# Patient Record
Sex: MALE | URBAN - METROPOLITAN AREA
[De-identification: names, ages, dates, MRNs, and addresses within clinical notes are randomized per-mention and may not be internally consistent; named-entity substitution may affect disease eponyms.]

---

## 2022-09-13 ENCOUNTER — TELEPHONE (OUTPATIENT)
Dept: PSYCHIATRY | Facility: CLINIC | Age: 32
End: 2022-09-13

## 2022-09-14 ENCOUNTER — TELEPHONE (OUTPATIENT)
Dept: PSYCHIATRY | Facility: CLINIC | Age: 32
End: 2022-09-14

## 2022-09-14 NOTE — TELEPHONE ENCOUNTER
Vyvanse 40mg [place] approval received for 12 months  Will send to be scanned into chart  Will make pharmacy aware

## 2022-09-29 ENCOUNTER — DOCUMENTATION (OUTPATIENT)
Dept: PSYCHIATRY | Facility: CLINIC | Age: 32
End: 2022-09-29

## 2022-11-04 DIAGNOSIS — F39 MOOD DISORDER (HCC): Primary | ICD-10-CM

## 2022-11-04 NOTE — TELEPHONE ENCOUNTER
Medication Refill Request     Name of Medication olanzapine  Dose/Frequency 5mg  Quantity 12  Verified pharmacy   [x]  Verified ordering Provider   [x]  Does patient have enough for the next 3 days? Yes [] No [x]  Does patient have a follow-up appointment scheduled?  Yes [x] No []   If so when is appointment: 11/9/2022

## 2022-11-07 RX ORDER — OLANZAPINE 5 MG/1
5 TABLET ORAL 2 TIMES DAILY
Qty: 12 TABLET | Refills: 0 | Status: SHIPPED | OUTPATIENT
Start: 2022-11-07 | End: 2022-11-09 | Stop reason: SDUPTHER

## 2022-11-09 ENCOUNTER — TELEMEDICINE (OUTPATIENT)
Dept: PSYCHIATRY | Facility: CLINIC | Age: 32
End: 2022-11-09

## 2022-11-09 DIAGNOSIS — F25.0 SCHIZOAFFECTIVE DISORDER, BIPOLAR TYPE (HCC): Primary | ICD-10-CM

## 2022-11-09 DIAGNOSIS — F39 MOOD DISORDER (HCC): ICD-10-CM

## 2022-11-09 DIAGNOSIS — F41.1 GENERALIZED ANXIETY DISORDER: ICD-10-CM

## 2022-11-09 DIAGNOSIS — F90.0 ATTENTION DEFICIT HYPERACTIVITY DISORDER, INATTENTIVE TYPE: ICD-10-CM

## 2022-11-09 PROBLEM — L23.7 POISON IVY: Status: ACTIVE | Noted: 2022-11-09

## 2022-11-09 RX ORDER — OLANZAPINE 5 MG/1
5 TABLET ORAL 2 TIMES DAILY
Qty: 60 TABLET | Refills: 1 | Status: SHIPPED | OUTPATIENT
Start: 2022-11-09

## 2022-11-09 RX ORDER — CLONAZEPAM 0.5 MG/1
TABLET ORAL
COMMUNITY
End: 2022-11-09

## 2022-11-09 RX ORDER — LAMOTRIGINE 200 MG/1
200 TABLET ORAL 2 TIMES DAILY
COMMUNITY
Start: 2022-10-11 | End: 2022-11-09 | Stop reason: SDUPTHER

## 2022-11-09 RX ORDER — GABAPENTIN 100 MG/1
CAPSULE ORAL
COMMUNITY
Start: 2022-10-19 | End: 2022-11-09

## 2022-11-09 RX ORDER — LISDEXAMFETAMINE DIMESYLATE 40 MG/1
40 CAPSULE ORAL EVERY MORNING
COMMUNITY
Start: 2022-10-19 | End: 2022-11-09 | Stop reason: SDUPTHER

## 2022-11-09 RX ORDER — GABAPENTIN 600 MG/1
600 TABLET ORAL 3 TIMES DAILY
Qty: 90 TABLET | Refills: 1 | Status: SHIPPED | OUTPATIENT
Start: 2022-11-09

## 2022-11-09 RX ORDER — GABAPENTIN 400 MG/1
CAPSULE ORAL
COMMUNITY
Start: 2022-10-20 | End: 2022-11-09

## 2022-11-09 RX ORDER — LISDEXAMFETAMINE DIMESYLATE 40 MG/1
40 CAPSULE ORAL EVERY MORNING
Qty: 30 CAPSULE | Refills: 0 | Status: SHIPPED | OUTPATIENT
Start: 2022-11-09

## 2022-11-09 RX ORDER — RISPERIDONE 1 MG/1
TABLET, FILM COATED ORAL EVERY 12 HOURS
COMMUNITY
End: 2022-11-09

## 2022-11-09 RX ORDER — LAMOTRIGINE 200 MG/1
200 TABLET ORAL 2 TIMES DAILY
Qty: 60 TABLET | Refills: 2 | Status: SHIPPED | OUTPATIENT
Start: 2022-11-09

## 2022-11-09 NOTE — PSYCH
Virtual Regular Visit    Verification of patient location:    Patient is located in the following state in which I hold an active license [place]      Assessment/Plan:  Increase neurontin 600 mg tid  Continue zyprexa 5 mg bid, lamictal 200 mg bid, vyvanse 40 mg     Problem List Items Addressed This Visit    None     Visit Diagnoses     Schizoaffective disorder, bipolar type ([facility] [address] )    -  Primary    Relevant Medications    Vyvanse 40 MG capsule    lisdexamfetamine (Vyvanse) 40 MG capsule    OLANZapine (ZyPREXA) 5 mg tablet    lamoTRIgine (LaMICtal) 200 MG tablet    Attention deficit hyperactivity disorder, inattentive type        Relevant Medications    Vyvanse 40 MG capsule    lisdexamfetamine (Vyvanse) 40 MG capsule    OLANZapine (ZyPREXA) 5 mg tablet    Generalized anxiety disorder        Relevant Medications    Vyvanse 40 MG capsule    lisdexamfetamine (Vyvanse) 40 MG capsule    OLANZapine (ZyPREXA) 5 mg tablet    gabapentin (Neurontin) 600 MG tablet    Mood disorder (HCC)        Relevant Medications    Vyvanse 40 MG capsule    lisdexamfetamine (Vyvanse) 40 MG capsule    OLANZapine (ZyPREXA) 5 mg tablet          Goals addressed in session: Goal 1          Reason for visit is   Chief Complaint   Patient presents with   • Medication Management        Encounter provider Bebe Stevens MD    Provider located [place] [address] [place] [address]  [phone]      Recent Visits  No visits were found meeting these conditions  Showing recent visits within past 7 days and meeting all other requirements  Today's Visits  Date Type Provider Dept   11/09/22 Telemedicine Bebe Stevens, [address] [place] [address] today's visits and meeting all other requirements  Future Appointments  No visits were found meeting these conditions    Showing future appointments within next 150 days and meeting all other requirements       The patient was identified by name and date of birth  Fern Xiao was informed that this is a telemedicine visit and that the [facility]  He agrees to proceed     My office door was closed  No one else was in the room  He acknowledged consent and understanding of privacy and security of the video platform  The patient has agreed to participate and understands they can discontinue the visit at any time  Patient is aware this is a billable service  Subjective  Fern Xiao is a 28 y o  male with SAD and ADHD presents for regular f/u   Compliant with meds, denies SE  Pt c/o feeling stressed, overwhelmed, anxious sometimes  Denies medical problems  Lives with mother, works at [employer] 1/2 P[employer]; occassionally beer; denies weed use      HPI   Mood - continues to have mood swings - 1-2 x week for 1/2 day - increased energy and racing thoughtsl stays up late and 3-4 x week episodes of low energy, " apathy", tired and depressed  Anxiety - increased racing thoughts, restless, overwhelmed  ADHD - baseline, good concentration most of the day  Psychosis - denies delusions or AH    History reviewed  No pertinent past medical history  History reviewed  No pertinent surgical history  Current Outpatient Medications   Medication Sig Dispense Refill   • gabapentin (Neurontin) 600 MG tablet Take 1 tablet (600 mg total) by mouth 3 (three) times a day 90 tablet 1   • lamoTRIgine (LaMICtal) 200 MG tablet Take 1 tablet (200 mg total) by mouth 2 (two) times a day 60 tablet 2   • lisdexamfetamine (Vyvanse) 40 MG capsule Take 1 capsule (40 mg total) by mouth every morning Max Daily Amount: 40 mg 30 capsule 0   • OLANZapine (ZyPREXA) 5 mg tablet Take 1 tablet (5 mg total) by mouth 2 (two) times a day 60 tablet 1   • Vyvanse 40 MG capsule Take 1 capsule (40 mg total) by mouth every morning Max Daily Amount: 40 mg 30 capsule 0     No current facility-administered medications for this visit  No Known Allergies    Review of Systems   Constitutional: Negative for activity change and appetite change  Psychiatric/Behavioral: Positive for sleep disturbance (poor sleep sometimes)  Negative for suicidal ideas  The patient is nervous/anxious  Video Exam    There were no vitals filed for this visit  Physical Exam  Constitutional:       Appearance: Normal appearance  He is normal weight  Neurological:      Mental Status: He is alert  Psychiatric:         Attention and Perception: Attention and perception normal          Mood and Affect: Mood is anxious  Affect is blunt  Speech: Speech is delayed  Behavior: Behavior normal  Behavior is cooperative  Thought Content:  Thought content normal           Visit Time    Visit Start Time: 10 57 am   Visit Stop Time:11 08 am   Total Visit Duration: 20

## 2023-01-24 ENCOUNTER — TELEMEDICINE (OUTPATIENT)
Dept: PSYCHIATRY | Facility: CLINIC | Age: 33
End: 2023-01-24

## 2023-01-24 DIAGNOSIS — F25.0 SCHIZOAFFECTIVE DISORDER, BIPOLAR TYPE (HCC): Primary | ICD-10-CM

## 2023-01-24 DIAGNOSIS — F90.0 ATTENTION DEFICIT HYPERACTIVITY DISORDER, INATTENTIVE TYPE: ICD-10-CM

## 2023-01-24 DIAGNOSIS — F39 MOOD DISORDER (HCC): ICD-10-CM

## 2023-01-24 DIAGNOSIS — F41.1 GENERALIZED ANXIETY DISORDER: ICD-10-CM

## 2023-01-24 RX ORDER — OLANZAPINE 5 MG/1
5 TABLET ORAL 2 TIMES DAILY
Qty: 60 TABLET | Refills: 2 | Status: SHIPPED | OUTPATIENT
Start: 2023-01-24

## 2023-01-24 RX ORDER — LISDEXAMFETAMINE DIMESYLATE 40 MG/1
40 CAPSULE ORAL EVERY MORNING
Qty: 30 CAPSULE | Refills: 0 | Status: SHIPPED | OUTPATIENT
Start: 2023-01-24

## 2023-01-24 RX ORDER — GABAPENTIN 600 MG/1
600 TABLET ORAL 3 TIMES DAILY
Qty: 90 TABLET | Refills: 2 | Status: SHIPPED | OUTPATIENT
Start: 2023-01-24

## 2023-01-24 RX ORDER — LAMOTRIGINE 200 MG/1
200 TABLET ORAL 2 TIMES DAILY
Qty: 60 TABLET | Refills: 2 | Status: SHIPPED | OUTPATIENT
Start: 2023-01-24

## 2023-01-24 NOTE — PSYCH
Virtual Regular Visit    Verification of patient location:    Patient is located in the following state in which [place]      Assessment/Plan:    Problem List Items Addressed This Visit    None  Visit Diagnoses     Schizoaffective disorder, bipolar type ([facility] [address] )    -  Primary    Relevant Medications    Vyvanse 40 MG capsule    OLANZapine (ZyPREXA) 5 mg tablet    lisdexamfetamine (Vyvanse) 40 MG capsule    lamoTRIgine (LaMICtal) 200 MG tablet    lisdexamfetamine (Vyvanse) 40 MG capsule    Attention deficit hyperactivity disorder, inattentive type        Relevant Medications    Vyvanse 40 MG capsule    OLANZapine (ZyPREXA) 5 mg tablet    lisdexamfetamine (Vyvanse) 40 MG capsule    lisdexamfetamine (Vyvanse) 40 MG capsule    Generalized anxiety disorder        Relevant Medications    Vyvanse 40 MG capsule    OLANZapine (ZyPREXA) 5 mg tablet    lisdexamfetamine (Vyvanse) 40 MG capsule    gabapentin (Neurontin) 600 MG tablet    lisdexamfetamine (Vyvanse) 40 MG capsule    Mood disorder (HCC)        Relevant Medications    Vyvanse 40 MG capsule    OLANZapine (ZyPREXA) 5 mg tablet    lisdexamfetamine (Vyvanse) 40 MG capsule    lisdexamfetamine (Vyvanse) 40 MG capsule          Goals addressed in session: Goal 1          Reason for visit is   Chief Complaint   Patient presents with   • Medication Management        Encounter provider Allyson Portillo MD    Provider located at [address] [place] [address]  [phone]      Recent Visits  No visits were found meeting these conditions  Showing recent visits within past 7 days and meeting all other requirements  Today's Visits  Date Type Provider Dept   01/24/23 Telemedicine Allyson Portillo, [address] [place] [address] today's visits and meeting all other requirements  Future Appointments  No visits were found meeting these conditions    Showing future appointments within next 150 days and meeting all other requirements       The patient was identified by name and date of birth  Carlos Sykes was informed that this is a telemedicine visit and that the [facility]  He agrees to proceed     My office door was closed  No one else was in the room  He acknowledged consent and understanding of privacy and security of the video platform  The patient has agreed to participate and understands they can discontinue the visit at any time  Patient is aware this is a billable service  Subjective  Carlos Sykes is a 28 y o  male with ADHD, mood, psychosis presents for regular f/u    Pt ran out of meds and was off meds for a week  - got supply  Recovering from URI  Works at [employer] with mother - some arguments  Grandfather in [place] - supportive - didn't`t come for [place]  Smokes 3/4 PPD   Denies weed or alcohol use      HPI   Mood - mild mood swings, but mostly stable; functions at baseline  Less depressed  Anxiety - improved on higher neurontin dose - less worries and racing thoughts; calmer and more relaxed  Psychosis - denies AH or delusions  ADHD - stable; baseline    History reviewed  No pertinent past medical history  History reviewed  No pertinent surgical history      Current Outpatient Medications   Medication Sig Dispense Refill   • gabapentin (Neurontin) 600 MG tablet Take 1 tablet (600 mg total) by mouth 3 (three) times a day 90 tablet 2   • lamoTRIgine (LaMICtal) 200 MG tablet Take 1 tablet (200 mg total) by mouth 2 (two) times a day 60 tablet 2   • lisdexamfetamine (Vyvanse) 40 MG capsule Take 1 capsule (40 mg total) by mouth every morning Max Daily Amount: 40 mg 30 capsule 0   • lisdexamfetamine (Vyvanse) 40 MG capsule Take 1 capsule (40 mg total) by mouth every morning Max Daily Amount: 40 mg 30 capsule 0   • OLANZapine (ZyPREXA) 5 mg tablet Take 1 tablet (5 mg total) by mouth 2 (two) times a day 60 tablet 2   • Vyvanse 40 MG capsule Take 1 capsule (40 mg total) by mouth every morning Max Daily Amount: 40 mg 30 capsule 0     No current facility-administered medications for this visit  No Known Allergies    Review of Systems   Constitutional: Negative for activity change and appetite change  Psychiatric/Behavioral: Negative for decreased concentration, dysphoric mood, sleep disturbance and suicidal ideas  The patient is not nervous/anxious  Video Exam    There were no vitals filed for this visit  Physical Exam  Constitutional:       Appearance: Normal appearance  He is normal weight  Neurological:      Mental Status: He is alert  Psychiatric:         Attention and Perception: Attention and perception normal          Mood and Affect: Mood normal  Affect is blunt  Behavior: Behavior normal  Behavior is cooperative  Thought Content:  Thought content normal          Judgment: Judgment normal       Comments: Monotone speech at baseline          Visit Time    Visit Start Time: 10 19 am   Visit Stop Time: 10 25 am   Total Visit Duration: 16 minutes

## 2023-04-25 ENCOUNTER — TELEMEDICINE (OUTPATIENT)
Dept: PSYCHIATRY | Facility: CLINIC | Age: 33
End: 2023-04-25

## 2023-04-25 DIAGNOSIS — F90.0 ATTENTION DEFICIT HYPERACTIVITY DISORDER, INATTENTIVE TYPE: ICD-10-CM

## 2023-04-25 DIAGNOSIS — F39 MOOD DISORDER (HCC): ICD-10-CM

## 2023-04-25 DIAGNOSIS — F25.0 SCHIZOAFFECTIVE DISORDER, BIPOLAR TYPE (HCC): Primary | ICD-10-CM

## 2023-04-25 DIAGNOSIS — F41.1 GENERALIZED ANXIETY DISORDER: ICD-10-CM

## 2023-04-25 RX ORDER — LAMOTRIGINE 200 MG/1
200 TABLET ORAL 2 TIMES DAILY
Qty: 60 TABLET | Refills: 2 | Status: SHIPPED | OUTPATIENT
Start: 2023-04-25

## 2023-04-25 RX ORDER — OLANZAPINE 5 MG/1
5 TABLET ORAL 2 TIMES DAILY
Qty: 60 TABLET | Refills: 2 | Status: SHIPPED | OUTPATIENT
Start: 2023-04-25

## 2023-04-25 RX ORDER — LISDEXAMFETAMINE DIMESYLATE 40 MG/1
40 CAPSULE ORAL EVERY MORNING
Qty: 30 CAPSULE | Refills: 0 | Status: SHIPPED | OUTPATIENT
Start: 2023-04-25

## 2023-04-25 RX ORDER — GABAPENTIN 600 MG/1
600 TABLET ORAL 3 TIMES DAILY
Qty: 90 TABLET | Refills: 2 | Status: SHIPPED | OUTPATIENT
Start: 2023-04-25

## 2023-04-25 NOTE — PSYCH
"  Virtual Regular Visit    Verification of patient location:    Patient is located at Home in the following state in which [place]      Assessment/Plan:    Problem List Items Addressed This Visit    None      Goals addressed in session: Goal 1          Reason for visit is   Chief Complaint   Patient presents with   • Medication Management        Encounter provider Ibeth Long MD    Provider located at [address] [place] [facility]  [address]  [phone]      Recent Visits  No visits were found meeting these conditions  Showing recent visits within past 7 days and meeting all other requirements  Today's Visits  Date Type Provider Dept   04/25/23 Telemedicine Ibeth Long, [address] [address] today's visits and meeting all other requirements  Future Appointments  No visits were found meeting these conditions  Showing future appointments within next 150 days and meeting all other requirements       The patient was identified by name and date of birth  Antonio Staff was informed that this is a telemedicine visit and that the visit [facility]  He agrees to proceed     My office door was closed  No one else was in the room  He acknowledged consent and understanding of privacy and security of the video platform  The patient has agreed to participate and understands they can discontinue the visit at any time  Patient is aware this is a billable service  Subjective  Antonio Staff is a 28 y o  male with SAD, ADHD presents for regular f/u     compliant with meds, denies SE  Today c/o \" not feeling well\" - diarrhea, fever, stomach pain   - stomach virus?   Increased work hrs, only 1 day off      HPI   Mood - pt feels stressed and overwhelmed; gets tired and depressed; less ADLs  Anxiety - increased intrusive negative racing thoughts  Psychosis - denies delusions or "   ADHD  - stable ; functions at baseline    History reviewed  No pertinent past medical history  History reviewed  No pertinent surgical history  Current Outpatient Medications   Medication Sig Dispense Refill   • gabapentin (Neurontin) 600 MG tablet Take 1 tablet (600 mg total) by mouth 3 (three) times a day 90 tablet 2   • lamoTRIgine (LaMICtal) 200 MG tablet Take 1 tablet (200 mg total) by mouth 2 (two) times a day 60 tablet 2   • lisdexamfetamine (Vyvanse) 40 MG capsule Take 1 capsule (40 mg total) by mouth every morning Max Daily Amount: 40 mg 30 capsule 0   • OLANZapine (ZyPREXA) 5 mg tablet Take 1 tablet (5 mg total) by mouth 2 (two) times a day 60 tablet 2   • lisdexamfetamine (Vyvanse) 40 MG capsule Take 1 capsule (40 mg total) by mouth every morning Max Daily Amount: 40 mg 30 capsule 0   • Vyvanse 40 MG capsule Take 1 capsule (40 mg total) by mouth every morning Max Daily Amount: 40 mg 30 capsule 0     No current facility-administered medications for this visit  No Known Allergies    Review of Systems   Constitutional: Negative for activity change and appetite change  Gastrointestinal: Positive for diarrhea  Psychiatric/Behavioral: Positive for dysphoric mood and sleep disturbance (poor sleep sometimes)  The patient is nervous/anxious  Video Exam    There were no vitals filed for this visit  Physical Exam  Constitutional:       Appearance: Normal appearance  He is normal weight  Neurological:      Mental Status: He is alert  Psychiatric:         Attention and Perception: Attention and perception normal          Mood and Affect: Mood is anxious and depressed  Affect is blunt  Speech: Speech normal          Behavior: Behavior normal  Behavior is cooperative  Thought Content:  Thought content normal          Judgment: Judgment normal           Visit Time    Visit Start Time: 10 18 am   Visit Stop Time: 10  26 am   Total Visit Duration: 17 minutes

## 2023-06-20 ENCOUNTER — TELEPHONE (OUTPATIENT)
Dept: PSYCHIATRY | Facility: CLINIC | Age: 33
End: 2023-06-20

## 2023-06-20 NOTE — TELEPHONE ENCOUNTER
Called and left message for client to reschedule appt  with Dr Page   Appt  due in July (prior to 7/25)

## 2023-07-29 DIAGNOSIS — F41.1 GENERALIZED ANXIETY DISORDER: ICD-10-CM

## 2023-07-31 RX ORDER — GABAPENTIN 600 MG/1
600 TABLET ORAL 3 TIMES DAILY
Qty: 90 TABLET | Refills: 2 | OUTPATIENT
Start: 2023-07-31

## 2023-08-15 ENCOUNTER — TELEPHONE (OUTPATIENT)
Dept: PSYCHIATRY | Facility: CLINIC | Age: 33
End: 2023-08-15

## 2023-08-17 NOTE — TELEPHONE ENCOUNTER
Another outreach call place. Informed client of the documents and payments that will need to be done to keep the appointment.   Requested call back to [phone]

## 2023-08-22 ENCOUNTER — TELEMEDICINE (OUTPATIENT)
Dept: PSYCHIATRY | Facility: CLINIC | Age: 33
End: 2023-08-22

## 2023-08-22 DIAGNOSIS — F25.0 SCHIZOAFFECTIVE DISORDER, BIPOLAR TYPE (HCC): Primary | ICD-10-CM

## 2023-08-22 DIAGNOSIS — F41.1 GENERALIZED ANXIETY DISORDER: ICD-10-CM

## 2023-08-22 DIAGNOSIS — F90.0 ATTENTION DEFICIT HYPERACTIVITY DISORDER, INATTENTIVE TYPE: ICD-10-CM

## 2023-08-22 DIAGNOSIS — F39 MOOD DISORDER (HCC): ICD-10-CM

## 2023-08-22 PROCEDURE — 99214 OFFICE O/P EST MOD 30 MIN: CPT | Performed by: PSYCHIATRY & NEUROLOGY

## 2023-08-22 RX ORDER — OLANZAPINE 5 MG/1
5 TABLET ORAL 2 TIMES DAILY
Qty: 60 TABLET | Refills: 1 | Status: SHIPPED | OUTPATIENT
Start: 2023-08-22

## 2023-08-22 RX ORDER — GABAPENTIN 600 MG/1
600 TABLET ORAL 3 TIMES DAILY
Qty: 90 TABLET | Refills: 1 | Status: SHIPPED | OUTPATIENT
Start: 2023-08-22

## 2023-08-22 RX ORDER — LISDEXAMFETAMINE DIMESYLATE 40 MG/1
40 CAPSULE ORAL EVERY MORNING
Qty: 30 CAPSULE | Refills: 0 | Status: SHIPPED | OUTPATIENT
Start: 2023-08-22

## 2023-08-22 RX ORDER — LAMOTRIGINE 200 MG/1
200 TABLET ORAL 2 TIMES DAILY
Qty: 60 TABLET | Refills: 1 | Status: SHIPPED | OUTPATIENT
Start: 2023-08-22

## 2023-08-22 NOTE — PSYCH
Virtual Regular Visit    Verification of patient location:    Patient is located at Home in the following state in which I [place] an [place]      Assessment/Plan:    Problem List Items Addressed This Visit    None      Goals addressed in session: Goal 1          Reason for visit is   Chief Complaint   Patient presents with   • Medication Management        Encounter provider Deysi العلي MD    Provider located at [address] [place] [address],[address]  [facility]  13[address] [facility] [address] [place] [address]  [phone]      Recent Visits  Date Type Provider Dept   08/15/23 Telephone 2304 [facility] 121 recent visits within past 7 days and meeting all other requirements  Today's Visits  Date Type Provider Dept   08/22/23 Telemedicine Deysi العلي, [address] today's visits and meeting all other requirements  Future Appointments  No visits were found meeting these conditions. Showing future appointments within next 150 days and meeting all other requirements       The patient was identified by name and date of birth. Mark Farfan was informed that this is a telemedicine visit and [facility]. He agrees to proceed. .  My office door was closed. No one else was in the room. He acknowledged consent and understanding of privacy and security of the video platform. The patient has agreed to participate and understands they can discontinue the visit at any time. Patient is aware this is a billable service. Subjective  Mark Farfan is a 35 y.o. male with SAD presents for regular f/u   He lost insurance; ran out of meds 2 weeks ago ( except lamictal)  Better work schedule; has 2 days x week off  Recovered from URI   Grandfather is visiting from [place] .       HPI   Mood - feeling stressed, sometimes emotionally unstable, but does ADLs  ADHD - gets overwhelmed, poor concentration, less productive; " struggles" at work  Psychosis - denies AH or delusions    History reviewed. No pertinent past medical history. History reviewed. No pertinent surgical history. Current Outpatient Medications   Medication Sig Dispense Refill   • gabapentin (Neurontin) 600 MG tablet Take 1 tablet (600 mg total) by mouth 3 (three) times a day 90 tablet 2   • lamoTRIgine (LaMICtal) 200 MG tablet Take 1 tablet (200 mg total) by mouth 2 (two) times a day 60 tablet 2   • lisdexamfetamine (Vyvanse) 40 MG capsule Take 1 capsule (40 mg total) by mouth every morning Max Daily Amount: 40 mg 30 capsule 0   • lisdexamfetamine (Vyvanse) 40 MG capsule Take 1 capsule (40 mg total) by mouth every morning Max Daily Amount: 40 mg 30 capsule 0   • OLANZapine (ZyPREXA) 5 mg tablet Take 1 tablet (5 mg total) by mouth 2 (two) times a day 60 tablet 2   • Vyvanse 40 MG capsule Take 1 capsule (40 mg total) by mouth every morning Max Daily Amount: 40 mg 30 capsule 0     No current facility-administered medications for this visit. No Known Allergies    Review of Systems   Constitutional: Negative for activity change and appetite change. Psychiatric/Behavioral: Positive for decreased concentration and sleep disturbance. Negative for dysphoric mood and suicidal ideas. The patient is not nervous/anxious. Video Exam    There were no vitals filed for this visit. Physical Exam  Constitutional:       Appearance: Normal appearance. He is normal weight. Comments: Unkempt hair   Neurological:      Mental Status: He is alert. Psychiatric:         Attention and Perception: Perception normal. He is inattentive. Mood and Affect: Mood normal. Affect is blunt. Speech: Speech normal.         Behavior: Behavior normal. Behavior is cooperative. Thought Content:  Thought content normal.         Judgment: Judgment normal.          Visit Time    Visit Start Time: 10.39 am    Visit Stop Time: 10.47 am    Total Visit Duration: 20 minutes

## 2023-10-10 ENCOUNTER — TELEPHONE (OUTPATIENT)
Dept: PSYCHIATRY | Facility: CLINIC | Age: 33
End: 2023-10-10

## 2023-10-16 ENCOUNTER — TELEMEDICINE (OUTPATIENT)
Dept: PSYCHIATRY | Facility: CLINIC | Age: 33
End: 2023-10-16
Payer: COMMERCIAL

## 2023-10-16 DIAGNOSIS — F41.1 GENERALIZED ANXIETY DISORDER: ICD-10-CM

## 2023-10-16 DIAGNOSIS — F39 MOOD DISORDER (HCC): ICD-10-CM

## 2023-10-16 DIAGNOSIS — F25.0 SCHIZOAFFECTIVE DISORDER, BIPOLAR TYPE (HCC): ICD-10-CM

## 2023-10-16 DIAGNOSIS — F90.0 ATTENTION DEFICIT HYPERACTIVITY DISORDER, INATTENTIVE TYPE: ICD-10-CM

## 2023-10-16 PROCEDURE — 99214 OFFICE O/P EST MOD 30 MIN: CPT | Performed by: PSYCHIATRY & NEUROLOGY

## 2023-10-16 RX ORDER — OLANZAPINE 5 MG/1
5 TABLET ORAL 2 TIMES DAILY
Qty: 60 TABLET | Refills: 2 | Status: SHIPPED | OUTPATIENT
Start: 2023-10-16

## 2023-10-16 RX ORDER — LISDEXAMFETAMINE DIMESYLATE CAPSULES 40 MG/1
40 CAPSULE ORAL EVERY MORNING
Qty: 30 CAPSULE | Refills: 0 | Status: SHIPPED | OUTPATIENT
Start: 2023-10-16

## 2023-10-16 RX ORDER — LAMOTRIGINE 200 MG/1
200 TABLET ORAL 2 TIMES DAILY
Qty: 60 TABLET | Refills: 2 | Status: SHIPPED | OUTPATIENT
Start: 2023-10-16

## 2023-10-16 RX ORDER — GABAPENTIN 600 MG/1
600 TABLET ORAL 3 TIMES DAILY
Qty: 90 TABLET | Refills: 2 | Status: SHIPPED | OUTPATIENT
Start: 2023-10-16

## 2023-10-16 RX ORDER — LISDEXAMFETAMINE DIMESYLATE 40 MG/1
40 CAPSULE ORAL EVERY MORNING
Qty: 30 CAPSULE | Refills: 0 | Status: SHIPPED | OUTPATIENT
Start: 2023-10-16

## 2023-10-16 NOTE — PSYCH
Virtual Regular Visit    Verification of patient location:    Patient is located at Home in the following state in which I [place] an [place]      Assessment/Plan:    Problem List Items Addressed This Visit    None      Goals addressed in session: Goal 1          Reason for visit is   Chief Complaint   Patient presents with    Medication Management        Encounter provider Ken Madrigal MD    Provider located at [address] [place] [address],[address]  [facility]  13[address] [facility] [address] [place] [address]  [phone]      Recent Visits  Date Type Provider Dept   10/10/23 Telephone 2304 [facility] 121 recent visits within past 7 days and meeting all other requirements  Today's Visits  Date Type Provider Dept   10/16/23 Telemedicine Ken Madrigal, [address] today's visits and meeting all other requirements  Future Appointments  No visits were found meeting these conditions. Showing future appointments within next 150 days and meeting all other requirements       The patient was identified by name and date of birth. Salvatore Reynolds was informed that this is a telemedicine visit and that [facility]. He agrees to proceed. .  My office door was closed. No one else was in the room. He acknowledged consent and understanding of privacy and security of the video platform. The patient has agreed to participate and understands they can discontinue the visit at any time. Patient is aware this is a billable service. Subjective  Salvatore Reynolds is a 35 y.o. male with SAD and ADHD presents for regular f/u  .   Off vyvanse; continued other meds  Still no insurance  Works  Lives with mother  Denies acute medical problems      HPI   Mood - improving on meds; but gets mild depression when anxious  Anxiety - racing thoughts, increased worries sometimes  ADHD - can function in his routine, but gets overwhelmed and stressed; racing thoughts at night, less productive, less focus    History reviewed. No pertinent past medical history. History reviewed. No pertinent surgical history. Current Outpatient Medications   Medication Sig Dispense Refill    gabapentin (Neurontin) 600 MG tablet Take 1 tablet (600 mg total) by mouth 3 (three) times a day 90 tablet 1    lamoTRIgine (LaMICtal) 200 MG tablet Take 1 tablet (200 mg total) by mouth 2 (two) times a day 60 tablet 1    lisdexamfetamine (Vyvanse) 40 MG capsule Take 1 capsule (40 mg total) by mouth every morning Max Daily Amount: 40 mg 30 capsule 0    lisdexamfetamine (Vyvanse) 40 MG capsule Take 1 capsule (40 mg total) by mouth every morning Max Daily Amount: 40 mg 30 capsule 0    OLANZapine (ZyPREXA) 5 mg tablet Take 1 tablet (5 mg total) by mouth 2 (two) times a day 60 tablet 1    Vyvanse 40 MG capsule Take 1 capsule (40 mg total) by mouth every morning Max Daily Amount: 40 mg 30 capsule 0     No current facility-administered medications for this visit. No Known Allergies    Review of Systems   Constitutional:  Negative for activity change and appetite change. Psychiatric/Behavioral:  Positive for decreased concentration and sleep disturbance (difficult to fall asleep). Negative for dysphoric mood and suicidal ideas. The patient is nervous/anxious. Video Exam    There were no vitals filed for this visit. Physical Exam  Constitutional:       Appearance: Normal appearance. He is normal weight. Neurological:      Mental Status: He is alert. Psychiatric:         Attention and Perception: Perception normal. He is inattentive. Mood and Affect: Mood normal. Affect is blunt. Speech: Speech normal.         Behavior: Behavior normal. Behavior is cooperative. Thought Content:  Thought content normal.         Judgment: Judgment normal.          Visit Time    Visit Start Time: 9.39 am   Visit Stop Time: 9.46 am   Total Visit Duration:  16 minutes    TREATMENT PLAN (Medication Management Only)        [facility]    Name and Date of Birth:  Yaquelin Lanier 35 y.o. 1990  Date of Treatment Plan: October 16, 2023  Diagnosis/Diagnoses:    1. Attention deficit hyperactivity disorder, inattentive type    2. Mood disorder (HCC)    3. Schizoaffective disorder, bipolar type ([address])    4. Generalized anxiety disorder      Strengths/Personal Resources for Self-Care: taking medications as prescribed, motivation for treatment. Area/Areas of need (in own words): anxiety, mood instability, ADHD symptoms  1. Long Term Goal: continue improvement in psychotic symptoms. Target Date:6 months - 4/16/2024  Person/Persons responsible for completion of goal: Marie Estrada  2. Short Term Objective (s) - How will we reach this goal?:   A. Provider new recommended medication/dosage changes and/or continue medication(s): continue current medications as prescribed Lamictal, Zyprexa, Vyvanse. B. N/A.  C. N/A. Target Date:6 months - 4/16/2024  Person/Persons Responsible for Completion of Goal: Marie Estrada  Progress Towards Goals: continuing treatment  Treatment Modality: medication management every 3 months  Review due 180 days from date of this plan: 6 months - 4/16/2024  Expected length of service: ongoing treatment  My Physician/PA/NP and I have developed this plan together and I agree to work on the goals and objectives. I understand the treatment goals that were developed for my treatment.

## 2024-01-18 DIAGNOSIS — F39 MOOD DISORDER (HCC): ICD-10-CM

## 2024-01-18 RX ORDER — OLANZAPINE 5 MG/1
5 TABLET ORAL 2 TIMES DAILY
Qty: 60 TABLET | Refills: 0 | Status: SHIPPED | OUTPATIENT
Start: 2024-01-18

## 2024-01-18 NOTE — TELEPHONE ENCOUNTER
Pt has appt scheduled 2/13. Requests RF of olanzapine to [facility] pharmacy

## 2024-02-13 ENCOUNTER — TELEMEDICINE (OUTPATIENT)
Dept: PSYCHIATRY | Facility: CLINIC | Age: 34
End: 2024-02-13

## 2024-02-13 DIAGNOSIS — F41.1 GENERALIZED ANXIETY DISORDER: ICD-10-CM

## 2024-02-13 DIAGNOSIS — F25.0 SCHIZOAFFECTIVE DISORDER, BIPOLAR TYPE (HCC): ICD-10-CM

## 2024-02-13 RX ORDER — LAMOTRIGINE 200 MG/1
200 TABLET ORAL 2 TIMES DAILY
Qty: 60 TABLET | Refills: 2 | Status: SHIPPED | OUTPATIENT
Start: 2024-02-13

## 2024-02-13 RX ORDER — OLANZAPINE 5 MG/1
5 TABLET ORAL 2 TIMES DAILY
Qty: 60 TABLET | Refills: 2 | Status: SHIPPED | OUTPATIENT
Start: 2024-02-13

## 2024-02-13 RX ORDER — GABAPENTIN 600 MG/1
600 TABLET ORAL 3 TIMES DAILY
Qty: 90 TABLET | Refills: 2 | Status: SHIPPED | OUTPATIENT
Start: 2024-02-13

## 2024-02-13 NOTE — PSYCH
"  Virtual Regular Visit    Verification of patient location:    Patient is located at Home in the following state in which I hold an active license [place]      Assessment/Plan:    Problem List Items Addressed This Visit    None      Goals addressed in session: Goal 1          Reason for visit is   Chief Complaint   Patient presents with    Medication Management        Encounter provider Jerri Purcell MD    Provider located at [facility] MENT[facility] OUTPAT[facility]  [address] [facility] [id number] [phone]      Recent Visits  No visits were found meeting these conditions.  Showing recent visits within past 7 days and meeting all other requirements  Today's Visits  Date Type Provider Dept   02/13/24 Telemedicine Jerri Purcell MD [facility]   Showing today's visits and meeting all other requirements  Future Appointments  No visits were found meeting these conditions.  Showing future appointments within next 150 days and meeting all other requirements       The patient was identified by name and date of birth. Juan Ferguson was informed that this is a telemedicine visit and that the visit is being conducted throughthe Epic Embedded platform. He agrees to proceed..  My office door was closed. No one else was in the room.  He acknowledged consent and understanding of privacy and security of the video platform. The patient has agreed to participate and understands they can discontinue the visit at any time.    Patient is aware this is a billable service.     Subjective  Juan Ferguson is a 33 y.o. male with SAD, ADHD presents for regular f/u  .  Pt has no insurance; off vyvanse; continued other meds; denies SE  Pt lives with mother; works  Denies acute medical problems      HPI   Mood - episodes of feeling sad and lonely; \" left out\"; low energy sometimes  Anxiety - increased racing thoughts  Psychosis - sometimes gets paranoid; denies AH  ADHD - can function in work " routine, but gets distracted; forgetful, less organized   History reviewed. No pertinent past medical history.    History reviewed. No pertinent surgical history.    Current Outpatient Medications   Medication Sig Dispense Refill    gabapentin (Neurontin) 600 MG tablet Take 1 tablet (600 mg total) by mouth 3 (three) times a day 90 tablet 2    lamoTRIgine (LaMICtal) 200 MG tablet Take 1 tablet (200 mg total) by mouth 2 (two) times a day 60 tablet 2    OLANZapine (ZyPREXA) 5 mg tablet Take 1 tablet (5 mg total) by mouth 2 (two) times a day 60 tablet 0    lisdexamfetamine (Vyvanse) 40 MG capsule Take 1 capsule (40 mg total) by mouth every morning Max Daily Amount: 40 mg 30 capsule 0    lisdexamfetamine (Vyvanse) 40 MG capsule Take 1 capsule (40 mg total) by mouth every morning Max Daily Amount: 40 mg 30 capsule 0    Vyvanse 40 MG capsule Take 1 capsule (40 mg total) by mouth every morning Max Daily Amount: 40 mg 30 capsule 0     No current facility-administered medications for this visit.        No Known Allergies    Review of Systems   Constitutional:  Positive for appetite change (low). Negative for activity change.   Psychiatric/Behavioral:  Positive for decreased concentration and dysphoric mood. Negative for hallucinations, sleep disturbance and suicidal ideas. The patient is nervous/anxious.        Video Exam    There were no vitals filed for this visit.    Physical Exam  Constitutional:       Appearance: Normal appearance.      Comments: Pt seems to lose weight   Neurological:      Mental Status: He is alert.   Psychiatric:         Attention and Perception: Perception normal. He is inattentive.         Mood and Affect: Mood is anxious. Affect is blunt.         Speech: Speech normal.         Behavior: Behavior normal. Behavior is cooperative.         Thought Content: Thought content normal.         Judgment: Judgment normal.          Visit Time    Visit Start Time: 3.56 pm   Visit Stop Time: 4.05 pm   Total Visit  Duration:  20 minutes         5 (moderate pain)

## 2024-05-07 ENCOUNTER — TELEMEDICINE (OUTPATIENT)
Dept: PSYCHIATRY | Facility: CLINIC | Age: 34
End: 2024-05-07

## 2024-05-07 DIAGNOSIS — F25.0 SCHIZOAFFECTIVE DISORDER, BIPOLAR TYPE (HCC): ICD-10-CM

## 2024-05-07 DIAGNOSIS — F90.0 ATTENTION DEFICIT HYPERACTIVITY DISORDER, INATTENTIVE TYPE: ICD-10-CM

## 2024-05-07 DIAGNOSIS — F41.1 GENERALIZED ANXIETY DISORDER: ICD-10-CM

## 2024-05-07 PROCEDURE — 99214 OFFICE O/P EST MOD 30 MIN: CPT | Performed by: PSYCHIATRY & NEUROLOGY

## 2024-05-07 RX ORDER — LAMOTRIGINE 200 MG/1
200 TABLET ORAL 2 TIMES DAILY
Qty: 60 TABLET | Refills: 2 | Status: SHIPPED | OUTPATIENT
Start: 2024-05-07

## 2024-05-07 RX ORDER — OLANZAPINE 5 MG/1
5 TABLET ORAL 2 TIMES DAILY
Qty: 60 TABLET | Refills: 2 | Status: SHIPPED | OUTPATIENT
Start: 2024-05-07

## 2024-05-07 RX ORDER — LISDEXAMFETAMINE DIMESYLATE 40 MG/1
40 CAPSULE ORAL EVERY MORNING
Qty: 30 CAPSULE | Refills: 0 | Status: SHIPPED | OUTPATIENT
Start: 2024-05-07

## 2024-05-07 RX ORDER — GABAPENTIN 600 MG/1
600 TABLET ORAL 3 TIMES DAILY
Qty: 90 TABLET | Refills: 2 | Status: SHIPPED | OUTPATIENT
Start: 2024-05-07

## 2024-05-07 NOTE — PSYCH
"  Virtual Regular Visit    Verification of patient location:    Patient is located at Home in the following state in which I hold an active license [place]      Assessment/Plan:    Problem List Items Addressed This Visit    None      Goals addressed in session: Goal 1          Reason for visit is   Chief Complaint   Patient presents with    Medication Management        Encounter provider Jerri Purcell MD      Recent Visits  No visits were found meeting these conditions.  Showing recent visits within past 7 days and meeting all other requirements  Today's Visits  Date Type Provider Dept   05/07/24 Telemedicine Jerri Purcell MD [facility]   Showing today's visits and meeting all other requirements  Future Appointments  No visits were found meeting these conditions.  Showing future appointments within next 150 days and meeting all other requirements       The patient was identified by name and date of birth. Juan Ferguson was informed that this is a telemedicine visit and that the visit is being conducted throughthe Epic Embedded platform. He agrees to proceed..  My office door was closed. No one else was in the room.  He acknowledged consent and understanding of privacy and security of the video platform. The patient has agreed to participate and understands they can discontinue the visit at any time.    Patient is aware this is a billable service.     Subjective  Juan Ferguson is a 33 y.o. male with ADHD and SAD presents for regular f/u  .  Pt has no insurance ( still didn`t apply); pays cash for meds; gets vyvanse 10-12 days supply.  Pt works 2 x week; lives with mother - gets along  Recovered from URIs several months ago    HPI   Mood - mild mood swings, gets depressed 2 x week, for 1-2 hrs - feeling sad, low energy  Sometimes gets frustrated; usual arguments with mother sometimes  Anxiety - racing intrusive  thoughts, in the morning feels \" on edge\"; sometimes restless - gets better after takes meds  ADHD - " remains distractible, overwhelmed, procrastinates. Pt takes vyvanse before work an dis able to function    History reviewed. No pertinent past medical history.    History reviewed. No pertinent surgical history.    Current Outpatient Medications   Medication Sig Dispense Refill    gabapentin (Neurontin) 600 MG tablet Take 1 tablet (600 mg total) by mouth 3 (three) times a day 90 tablet 2    lamoTRIgine (LaMICtal) 200 MG tablet Take 1 tablet (200 mg total) by mouth 2 (two) times a day 60 tablet 2    OLANZapine (ZyPREXA) 5 mg tablet Take 1 tablet (5 mg total) by mouth 2 (two) times a day 60 tablet 2    lisdexamfetamine (Vyvanse) 40 MG capsule Take 1 capsule (40 mg total) by mouth every morning Max Daily Amount: 40 mg 30 capsule 0    lisdexamfetamine (Vyvanse) 40 MG capsule Take 1 capsule (40 mg total) by mouth every morning Max Daily Amount: 40 mg 30 capsule 0    Vyvanse 40 MG capsule Take 1 capsule (40 mg total) by mouth every morning Max Daily Amount: 40 mg 30 capsule 0     No current facility-administered medications for this visit.        No Known Allergies    Review of Systems   Constitutional:  Negative for activity change and appetite change.   Psychiatric/Behavioral:  Positive for decreased concentration and dysphoric mood. Negative for sleep disturbance and suicidal ideas. The patient is nervous/anxious.        Video Exam    There were no vitals filed for this visit.    Physical Exam  Constitutional:       Appearance: Normal appearance. He is normal weight.   Neurological:      Mental Status: He is alert.   Psychiatric:         Attention and Perception: Perception normal. He is inattentive.         Mood and Affect: Mood is anxious. Affect is blunt.         Speech: Speech normal.         Behavior: Behavior normal. Behavior is cooperative.         Thought Content: Thought content normal.         Judgment: Judgment normal.          Visit Time    Visit Start Time: 3.59 pm   Visit Stop Time: 4.08 pm   Total Visit  Duration:  25 minutes    TREATMENT PLAN (Medication Management Only)        [facility]    Name and Date of Birth:  Juan Ferguson 33 y.o. 1990  Date of Treatment Plan: May 7, 2024  Diagnosis/Diagnoses:  No diagnosis found.  Strengths/Personal Resources for Self-Care: taking medications as prescribed, motivation for treatment.  Area/Areas of need (in own words): anxiety, depression, ADHD symptoms  1. Long Term Goal: improve ADHD symptoms.  Target Date:6 months - 11/7/2024  Person/Persons responsible for completion of goal: Juan  2.  Short Term Objective (s) - How will we reach this goal?:   A. Provider new recommended medication/dosage changes and/or continue medication(s): continue current medications as prescribed Lamictal, Neurontin, Zyprexa, Vyvanse.  B. N/A.  C. N/A.  Target Date:6 months - 11/7/2024  Person/Persons Responsible for Completion of Goal: Juan  Progress Towards Goals: continuing treatment  Treatment Modality: medication management every 3 months  Review due 180 days from date of this plan: 6 months - 11/7/2024  Expected length of service: ongoing treatment  My Physician/PA/NP and I have developed this plan together and I agree to work on the goals and objectives. I understand the treatment goals that were developed for my treatment.

## 2024-07-29 ENCOUNTER — TELEPHONE (OUTPATIENT)
Dept: PSYCHIATRY | Facility: CLINIC | Age: 34
End: 2024-07-29

## 2024-07-29 NOTE — TELEPHONE ENCOUNTER
Called and left message for client.  Need Acknowledgement of Self Pay form signed in [employer] prior to 11:00 am tomorrow for appt. with Dr. Jerri Purcell on 7/30 at 2:00 pm.

## 2024-07-30 ENCOUNTER — TELEMEDICINE (OUTPATIENT)
Dept: PSYCHIATRY | Facility: CLINIC | Age: 34
End: 2024-07-30

## 2024-07-30 DIAGNOSIS — F25.0 SCHIZOAFFECTIVE DISORDER, BIPOLAR TYPE (HCC): Primary | ICD-10-CM

## 2024-07-30 DIAGNOSIS — F90.0 ATTENTION DEFICIT HYPERACTIVITY DISORDER, INATTENTIVE TYPE: ICD-10-CM

## 2024-07-30 DIAGNOSIS — F41.1 GENERALIZED ANXIETY DISORDER: ICD-10-CM

## 2024-07-30 PROCEDURE — 99214 OFFICE O/P EST MOD 30 MIN: CPT | Performed by: PSYCHIATRY & NEUROLOGY

## 2024-07-30 RX ORDER — GABAPENTIN 600 MG/1
600 TABLET ORAL 3 TIMES DAILY
Qty: 90 TABLET | Refills: 2 | Status: SHIPPED | OUTPATIENT
Start: 2024-07-30

## 2024-07-30 RX ORDER — LAMOTRIGINE 200 MG/1
200 TABLET ORAL 2 TIMES DAILY
Qty: 60 TABLET | Refills: 2 | Status: SHIPPED | OUTPATIENT
Start: 2024-07-30

## 2024-07-30 RX ORDER — OLANZAPINE 5 MG/1
5 TABLET ORAL 2 TIMES DAILY
Qty: 60 TABLET | Refills: 2 | Status: SHIPPED | OUTPATIENT
Start: 2024-07-30

## 2024-07-30 RX ORDER — LISDEXAMFETAMINE DIMESYLATE 40 MG/1
40 CAPSULE ORAL EVERY MORNING
Qty: 30 CAPSULE | Refills: 0 | Status: SHIPPED | OUTPATIENT
Start: 2024-07-30

## 2024-07-30 NOTE — PSYCH
Virtual Regular Visit    Verification of patient location:    Patient is located at Home in the following state in which I hold an active license [place]      Assessment/Plan:    Problem List Items Addressed This Visit    None      Goals addressed in session: Goal 1          Reason for visit is   Chief Complaint   Patient presents with    Medication Management        Encounter provider Jerri Purcell MD      Recent Visits  Date Type Provider Dept   07/29/24 Telephone [facility]   Showing recent visits within past 7 days and meeting all other requirements  Today's Visits  Date Type Provider Dept   07/30/24 Telemedicine [facility]   Showing today's visits and meeting all other requirements  Future Appointments  No visits were found meeting these conditions.  Showing future appointments within next 150 days and meeting all other requirements       The patient was identified by name and date of birth. Juan Ferguson was informed that this is a telemedicine visit and that the visit is being conducted throughthe Epic Embedded platform. He agrees to proceed..  My office door was closed. No one else was in the room.  He acknowledged consent and understanding of privacy and security of the video platform. The patient has agreed to participate and understands they can discontinue the visit at any time.    Patient is aware this is a billable service.     Subjective  Juan Ferguson is a 34 y.o. male with ADHD , SAD, anxiety presents for regular f/u  .  Pt has no insurance; off vyvanse for a month; continued other meds; denies SE  Pt lost weight; current weight 147 lbs  Denies acute medical problems  Pt works 5 x week; does house/ yard work; gets along with his mother    HPI   Mood - sometimes gets sad, gets tired after work; denies mood swings  He does ADLs; functions close to baseline  Anxiety - racing thoughts sometimes  Psychosis - denies delusions or AH  ADHD - off  vyvanse poor concentration, forgetful and distarctible  History reviewed. No pertinent past medical history.    History reviewed. No pertinent surgical history.    Current Outpatient Medications   Medication Sig Dispense Refill    lamoTRIgine (LaMICtal) 200 MG tablet Take 1 tablet (200 mg total) by mouth 2 (two) times a day 60 tablet 2    gabapentin (Neurontin) 600 MG tablet Take 1 tablet (600 mg total) by mouth 3 (three) times a day 90 tablet 2    lisdexamfetamine (Vyvanse) 40 MG capsule Take 1 capsule (40 mg total) by mouth every morning Max Daily Amount: 40 mg 30 capsule 0    lisdexamfetamine (Vyvanse) 40 MG capsule Take 1 capsule (40 mg total) by mouth every morning Max Daily Amount: 40 mg 30 capsule 0    OLANZapine (ZyPREXA) 5 mg tablet Take 1 tablet (5 mg total) by mouth 2 (two) times a day 60 tablet 2    Vyvanse 40 MG capsule Take 1 capsule (40 mg total) by mouth every morning Max Daily Amount: 40 mg 30 capsule 0     No current facility-administered medications for this visit.        No Known Allergies    Review of Systems   Constitutional:  Negative for activity change and appetite change.   Psychiatric/Behavioral:  Positive for decreased concentration. Negative for dysphoric mood, hallucinations, sleep disturbance and suicidal ideas. The patient is not nervous/anxious.        Video Exam    There were no vitals filed for this visit.    Physical Exam  Constitutional:       Appearance: Normal appearance.   Neurological:      Mental Status: He is alert.   Psychiatric:         Attention and Perception: Perception normal. He is inattentive.         Mood and Affect: Mood normal. Mood is not depressed. Affect is blunt.         Speech: Speech normal.         Behavior: Behavior normal. Behavior is cooperative.         Thought Content: Thought content normal.         Judgment: Judgment normal.          Visit Time    Visit Start Time: 1.57 pm   Visit Stop Time: 2.07 pm   Total Visit Duration:  20 minutes

## 2024-08-02 ENCOUNTER — TELEPHONE (OUTPATIENT)
Dept: PSYCHIATRY | Facility: CLINIC | Age: 34
End: 2024-08-02

## 2024-08-02 NOTE — TELEPHONE ENCOUNTER
Called and left message for client offering follow-up medication check appt. with Dr. Jerri Purcell on 10/22 at 2:00 pm.      Self Pay documentation will be prepared and forwarded to [facility] for review and signing.

## 2024-10-24 ENCOUNTER — TELEPHONE (OUTPATIENT)
Age: 34
End: 2024-10-24

## 2024-10-24 NOTE — TELEPHONE ENCOUNTER
Juan called to reschedule his cancelled appt with Dr Purcell. He requested Thurs or Fri. Writer scheduled Virtual for Thurs 11/14/24.

## 2024-10-25 DIAGNOSIS — F25.0 SCHIZOAFFECTIVE DISORDER, BIPOLAR TYPE (HCC): ICD-10-CM

## 2024-10-28 RX ORDER — OLANZAPINE 5 MG/1
5 TABLET ORAL 2 TIMES DAILY
Qty: 60 TABLET | Refills: 2 | Status: SHIPPED | OUTPATIENT
Start: 2024-10-28

## 2024-11-03 DIAGNOSIS — F25.0 SCHIZOAFFECTIVE DISORDER, BIPOLAR TYPE (HCC): ICD-10-CM

## 2024-11-04 RX ORDER — LAMOTRIGINE 200 MG/1
200 TABLET ORAL 2 TIMES DAILY
Qty: 60 TABLET | Refills: 0 | Status: SHIPPED | OUTPATIENT
Start: 2024-11-04 | End: 2024-11-14 | Stop reason: SDUPTHER

## 2024-11-14 ENCOUNTER — TELEPHONE (OUTPATIENT)
Dept: PSYCHIATRY | Facility: CLINIC | Age: 34
End: 2024-11-14

## 2024-11-14 ENCOUNTER — TELEMEDICINE (OUTPATIENT)
Dept: PSYCHIATRY | Facility: CLINIC | Age: 34
End: 2024-11-14

## 2024-11-14 DIAGNOSIS — F25.0 SCHIZOAFFECTIVE DISORDER, BIPOLAR TYPE (HCC): Primary | ICD-10-CM

## 2024-11-14 DIAGNOSIS — F90.0 ATTENTION DEFICIT HYPERACTIVITY DISORDER, INATTENTIVE TYPE: ICD-10-CM

## 2024-11-14 DIAGNOSIS — F41.1 GENERALIZED ANXIETY DISORDER: ICD-10-CM

## 2024-11-14 PROCEDURE — 99214 OFFICE O/P EST MOD 30 MIN: CPT | Performed by: PSYCHIATRY & NEUROLOGY

## 2024-11-14 RX ORDER — GABAPENTIN 600 MG/1
600 TABLET ORAL 3 TIMES DAILY
Qty: 90 TABLET | Refills: 2 | Status: SHIPPED | OUTPATIENT
Start: 2024-11-14

## 2024-11-14 RX ORDER — LAMOTRIGINE 200 MG/1
200 TABLET ORAL 2 TIMES DAILY
Qty: 60 TABLET | Refills: 0 | Status: SHIPPED | OUTPATIENT
Start: 2024-11-14

## 2024-11-14 RX ORDER — LISDEXAMFETAMINE DIMESYLATE 40 MG/1
40 CAPSULE ORAL EVERY MORNING
Qty: 30 CAPSULE | Refills: 0 | Status: SHIPPED | OUTPATIENT
Start: 2024-11-14

## 2024-11-14 RX ORDER — OLANZAPINE 5 MG/1
5 TABLET ORAL 2 TIMES DAILY
Qty: 60 TABLET | Refills: 2 | Status: SHIPPED | OUTPATIENT
Start: 2024-11-14

## 2024-11-14 NOTE — BH TREATMENT PLAN
TREATMENT PLAN (Medication Management Only)        [facility]    Name and Date of Birth:  Juan Ferguson 34 y.o. 1990  Date of Treatment Plan: November 14, 2024  Diagnosis/Diagnoses:    1. Schizoaffective disorder, bipolar type (HCC)    2. Attention deficit hyperactivity disorder, inattentive type    3. Generalized anxiety disorder      Strengths/Personal Resources for Self-Care: taking medications as prescribed, motivation for treatment.  Area/Areas of need (in own words): mood instability, ADHD symptoms  1. Long Term Goal: improve ADHD symptoms.  Target Date:6 months - 5/14/2025  Person/Persons responsible for completion of goal: Juan  2.  Short Term Objective (s) - How will we reach this goal?:   A. Provider new recommended medication/dosage changes and/or continue medication(s): continue current medications as prescribed Lamictal, Neurontin, Zyprexa, Vyvanse.  B. N/A.  C. N/A.  Target Date:6 months - 5/14/2025  Person/Persons Responsible for Completion of Goal: Juan  Progress Towards Goals: continuing treatment  Treatment Modality: medication management every 3 months  Review due 180 days from date of this plan: 6 months - 5/14/2025  Expected length of service: ongoing treatment  My Physician/PA/NP and I have developed this plan together and I agree to work on the goals and objectives. I understand the treatment goals that were developed for my treatment.

## 2024-11-14 NOTE — TELEPHONE ENCOUNTER
Writer took call from client to pre-pay for Dr. Purcell appointment 11/14/24. Client confirmed card on file to use and text confirmation.     Client put $30 towards today's total.

## 2024-11-14 NOTE — TELEPHONE ENCOUNTER
Writer called and left voicemail for client to call back and schedule 3 month follow up with Dr. Purcell. Writer also informed client in voicemail that the yearly paperwork will be sent out to his MyChart as it will be due prior to next appointment.

## 2024-11-14 NOTE — PSYCH
Virtual Regular Visit    Verification of patient location:    Patient is located at Home in the following state in which I hold an active license [place]      Assessment/Plan:  Assessment & Plan  Schizoaffective disorder, bipolar type (HCC)    Orders:    lamoTRIgine (LaMICtal) 200 MG tablet; Take 1 tablet (200 mg total) by mouth 2 (two) times a day    OLANZapine (ZyPREXA) 5 mg tablet; Take 1 tablet (5 mg total) by mouth 2 (two) times a day    Attention deficit hyperactivity disorder, inattentive type    Orders:    lisdexamfetamine (Vyvanse) 40 MG capsule; Take 1 capsule (40 mg total) by mouth every morning Max Daily Amount: 40 mg    lisdexamfetamine (Vyvanse) 40 MG capsule; Take 1 capsule (40 mg total) by mouth every morning Max Daily Amount: 40 mg    Vyvanse 40 MG capsule; Take 1 capsule (40 mg total) by mouth every morning Max Daily Amount: 40 mg    Generalized anxiety disorder    Orders:    gabapentin (Neurontin) 600 MG tablet; Take 1 tablet (600 mg total) by mouth 3 (three) times a day       Problem List Items Addressed This Visit    None  Visit Diagnoses         Schizoaffective disorder, bipolar type (HCC)    -  Primary      Attention deficit hyperactivity disorder, inattentive type          Generalized anxiety disorder                Goals addressed in session: Goal 1          Reason for visit is   Chief Complaint   Patient presents with    Medication Management        Encounter provider Jerri Purcell MD      Recent Visits  No visits were found meeting these conditions.  Showing recent visits within past 7 days and meeting all other requirements  Today's Visits  Date Type Provider Dept   11/14/24 Telephone [facility]   11/14/24 Telephone [facility]   11/14/24 Telemedicine Jerri Purcell MD [facility]   Showing today's visits and meeting all other requirements  Future Appointments  No visits were found meeting these conditions.  Showing  future appointments within next 150 days and meeting all other requirements       The patient was identified by name and date of birth. Juan Ferguson was informed that this is a telemedicine visit and that the visit is being conducted throughthe Epic Embedded platform. He agrees to proceed..  My office door was closed. No one else was in the room.  He acknowledged consent and understanding of privacy and security of the video platform. The patient has agreed to participate and understands they can discontinue the visit at any time.    Patient is aware this is a billable service.     Subjective  Juan Ferguson is a 34 y.o. male with SAD, ADHD, anxiety presents for regular f/u  .  Pt has no insurance, can`t afford a month supply of vyavnse; can`t afford blood work  Denies SE  Pt started a new job at another [employer] - better schedule and benefits; expects insurance next month  Pt lives with mother; grandfather didn`t visit this year  Pt smokes cigarettes 1/2 PPD; denies weed or alcohol use    HPI   Mood - 1-2 x week gets sad, low energy, but does ADLs, continues to function at baseline  He is not very social at baseline  Anxiety - worries sometimes; denies panic attacks  Psychosis - denies AH or delusions  ADHD - off vyvanse - poor concentration., less productive, poorly organized  Sleep - good    History reviewed. No pertinent past medical history.    History reviewed. No pertinent surgical history.    Current Outpatient Medications   Medication Sig Dispense Refill    lamoTRIgine (LaMICtal) 200 MG tablet Take 1 tablet (200 mg total) by mouth 2 (two) times a day 60 tablet 0    gabapentin (Neurontin) 600 MG tablet Take 1 tablet (600 mg total) by mouth 3 (three) times a day 90 tablet 2    lisdexamfetamine (Vyvanse) 40 MG capsule Take 1 capsule (40 mg total) by mouth every morning Max Daily Amount: 40 mg 30 capsule 0    lisdexamfetamine (Vyvanse) 40 MG capsule Take 1 capsule (40 mg total) by mouth every morning Max Daily  Amount: 40 mg 30 capsule 0    OLANZapine (ZyPREXA) 5 mg tablet Take 1 tablet (5 mg total) by mouth 2 (two) times a day 60 tablet 2    Vyvanse 40 MG capsule Take 1 capsule (40 mg total) by mouth every morning Max Daily Amount: 40 mg 30 capsule 0     No current facility-administered medications for this visit.        No Known Allergies    Review of Systems   Constitutional:  Negative for activity change and appetite change.   Psychiatric/Behavioral:  Positive for decreased concentration. Negative for dysphoric mood, sleep disturbance and suicidal ideas. The patient is not nervous/anxious.        Video Exam    There were no vitals filed for this visit.    Physical Exam  Constitutional:       Appearance: Normal appearance. He is normal weight.   Neurological:      Mental Status: He is alert.   Psychiatric:         Attention and Perception: Perception normal. He is inattentive.         Mood and Affect: Mood normal. Affect is blunt.         Behavior: Behavior normal. Behavior is cooperative.         Thought Content: Thought content normal.         Judgment: Judgment normal.      Comments: Not very talkative at baseline, monotone speech          Visit Time  Face to face  Visit Start Time: 3.30 pm   Visit Stop Time: 3.37 pm   Total Visit Duration:  20 minutes total spent in patient care

## 2024-11-14 NOTE — TELEPHONE ENCOUNTER
Patient contacted the office to schedule a follow up visit with provider. Patient is now scheduled for 2/20/25 at 3:30pm  tvirtually.

## 2024-11-14 NOTE — TELEPHONE ENCOUNTER
Writer called and left voicemail to remind client that in order for us to check in and see him today as self pay he will have to put at least $30 towards the $150 estimate prior to the start time of his appointment.

## 2024-12-18 ENCOUNTER — TELEPHONE (OUTPATIENT)
Dept: PSYCHIATRY | Facility: CLINIC | Age: 34
End: 2024-12-18

## 2024-12-18 NOTE — TELEPHONE ENCOUNTER
Writer called and left voicemail which rescheduled client's 2/20/25 appointment for 2/25/25 at 4:30 PM (writer originally said 4, but corrected as it was taken) due to client's previous availability. Dr. Purcell's schedule change blocked client's 2/20/25 appointment.     Writer did tell client in voicemail that there are sooner times/dates available but he tried to match it best to when previously seen.     Writer asked client to call back if date/time does not work.

## 2025-02-27 ENCOUNTER — TELEPHONE (OUTPATIENT)
Dept: PSYCHIATRY | Facility: CLINIC | Age: 35
End: 2025-02-27

## 2025-02-27 ENCOUNTER — TELEPHONE (OUTPATIENT)
Age: 35
End: 2025-02-27

## 2025-02-27 DIAGNOSIS — F25.0 SCHIZOAFFECTIVE DISORDER, BIPOLAR TYPE (HCC): ICD-10-CM

## 2025-02-27 RX ORDER — OLANZAPINE 5 MG/1
5 TABLET ORAL 2 TIMES DAILY
Qty: 60 TABLET | Refills: 2 | Status: SHIPPED | OUTPATIENT
Start: 2025-02-27

## 2025-02-27 RX ORDER — LAMOTRIGINE 200 MG/1
200 TABLET ORAL 2 TIMES DAILY
Qty: 60 TABLET | Refills: 0 | Status: SHIPPED | OUTPATIENT
Start: 2025-02-27

## 2025-02-27 NOTE — TELEPHONE ENCOUNTER
Writer called and let client know that al his yearly paperwork will be due for his appointment in two weeks.    Client also confirmed he will be self-pay and is not expecting new insurance to start in March.    Client will need refills of his olanzapine and Lamictal to last until his appointment. Writer forwarded message to provider.

## 2025-02-28 DIAGNOSIS — Z79.899 ENCOUNTER FOR LONG-TERM (CURRENT) USE OF MEDICATIONS: Primary | ICD-10-CM

## 2025-03-05 DIAGNOSIS — F41.1 GENERALIZED ANXIETY DISORDER: ICD-10-CM

## 2025-03-05 RX ORDER — GABAPENTIN 600 MG/1
600 TABLET ORAL 3 TIMES DAILY
Qty: 90 TABLET | Refills: 0 | Status: SHIPPED | OUTPATIENT
Start: 2025-03-05 | End: 2025-03-13 | Stop reason: SDUPTHER

## 2025-03-13 ENCOUNTER — TELEPHONE (OUTPATIENT)
Dept: PSYCHIATRY | Facility: CLINIC | Age: 35
End: 2025-03-13

## 2025-03-13 ENCOUNTER — TELEMEDICINE (OUTPATIENT)
Dept: PSYCHIATRY | Facility: CLINIC | Age: 35
End: 2025-03-13
Payer: COMMERCIAL

## 2025-03-13 DIAGNOSIS — F25.0 SCHIZOAFFECTIVE DISORDER, BIPOLAR TYPE (HCC): ICD-10-CM

## 2025-03-13 DIAGNOSIS — F90.0 ATTENTION DEFICIT HYPERACTIVITY DISORDER, INATTENTIVE TYPE: ICD-10-CM

## 2025-03-13 DIAGNOSIS — F41.1 GENERALIZED ANXIETY DISORDER: ICD-10-CM

## 2025-03-13 PROCEDURE — 99214 OFFICE O/P EST MOD 30 MIN: CPT | Performed by: PSYCHIATRY & NEUROLOGY

## 2025-03-13 RX ORDER — LISDEXAMFETAMINE DIMESYLATE 40 MG/1
40 CAPSULE ORAL EVERY MORNING
Qty: 30 CAPSULE | Refills: 0 | Status: SHIPPED | OUTPATIENT
Start: 2025-03-13

## 2025-03-13 RX ORDER — OLANZAPINE 5 MG/1
5 TABLET ORAL 2 TIMES DAILY
Qty: 60 TABLET | Refills: 2 | Status: SHIPPED | OUTPATIENT
Start: 2025-03-13

## 2025-03-13 RX ORDER — LAMOTRIGINE 200 MG/1
200 TABLET ORAL 2 TIMES DAILY
Qty: 60 TABLET | Refills: 0 | Status: SHIPPED | OUTPATIENT
Start: 2025-03-13

## 2025-03-13 RX ORDER — GABAPENTIN 600 MG/1
600 TABLET ORAL 3 TIMES DAILY
Qty: 90 TABLET | Refills: 0 | Status: SHIPPED | OUTPATIENT
Start: 2025-03-13

## 2025-03-13 NOTE — TELEPHONE ENCOUNTER
Writer called and scheduled follow up with Dr. Purcell and went over the forms client will have to resign.

## 2025-03-13 NOTE — TELEPHONE ENCOUNTER
Pt called to update insurance. [employer] insurance is now added to pt chart/appts. Writer explained that all required documents will need to be signed prior to being seen virtually - pt expressed understanding.

## 2025-03-13 NOTE — PSYCH
Virtual Regular Visit    Verification of patient location:    Patient is located at Home in the following state in which I hold an active license [place]      Assessment/Plan:  Assessment & Plan  Generalized anxiety disorder    Orders:    gabapentin (NEURONTIN) 600 MG tablet; Take 1 tablet (600 mg total) by mouth 3 (three) times a day    Schizoaffective disorder, bipolar type (HCC)    Orders:    lamoTRIgine (LaMICtal) 200 MG tablet; Take 1 tablet (200 mg total) by mouth 2 (two) times a day    OLANZapine (ZyPREXA) 5 mg tablet; Take 1 tablet (5 mg total) by mouth 2 (two) times a day    Lipid panel; Future    Basic metabolic panel; Future    Vitamin D 25 hydroxy; Future    Attention deficit hyperactivity disorder, inattentive type    Orders:    lisdexamfetamine (Vyvanse) 40 MG capsule; Take 1 capsule (40 mg total) by mouth every morning Max Daily Amount: 40 mg    lisdexamfetamine (Vyvanse) 40 MG capsule; Take 1 capsule (40 mg total) by mouth every morning Max Daily Amount: 40 mg       Problem List Items Addressed This Visit    None  Visit Diagnoses         Generalized anxiety disorder          Schizoaffective disorder, bipolar type (HCC)          Attention deficit hyperactivity disorder, inattentive type                Goals addressed in session: Goal 1     Depression Follow-up Plan Completed: Not applicable    Reason for visit is   Chief Complaint   Patient presents with    Medication Management        Encounter provider Jerri Purcell MD      Recent Visits  No visits were found meeting these conditions.  Showing recent visits within past 7 days and meeting all other requirements  Today's Visits  Date Type Provider Dept   03/13/25 Telephone [facility]   03/13/25 Telemedicine Jerri Purcell MD [facility]   Showing today's visits and meeting all other requirements  Future Appointments  No visits were found meeting these conditions.  Showing future appointments within next 150 days  "and meeting all other requirements       The patient was identified by name and date of birth. Juan Ferguson was informed that this is a telemedicine visit and that the visit is being conducted throughthe Epic Embedded platform. He agrees to proceed..  My office door was closed. No one else was in the room.  He acknowledged consent and understanding of privacy and security of the video platform. The patient has agreed to participate and understands they can discontinue the visit at any time.    Patient is aware this is a billable service.     Subjective  Juan Ferguson is a 34 y.o. male with SAD, ADHD, anxiety presents for regular f/u  .  Sometimes he forgets to take afternoon neurontin or morning vyvanse; but mostly compliant with meds;  denies SE  I reviewed the chart  Pt recovered from URI x 2 since last visit; denies acute medical problems  He works at [employer] at [employer]  Pt lives with mother; stays mostly inside   HPI   Mood - reports as mostly stable; denies depression or mood swings. Sometimes gets tired, but does ADLs. He is not very social at baseline; stays mostly inside.  Anxiety - if forgets afternoon neurontin dose, reports feeling \"stressed, overwhelmed\"; difficult to function at work. Denies panic attacks.  Psychosis -  1 x week, at work , gets suspicious that \" people look at me and talk about me\". It makes him \" stressed\", but denies irritability or anger.   ADHD - stable; baseline concentration and functioning   Sleep - \" on and off\"  History reviewed. No pertinent past medical history.    History reviewed. No pertinent surgical history.    Current Outpatient Medications   Medication Sig Dispense Refill    gabapentin (NEURONTIN) 600 MG tablet Take 1 tablet (600 mg total) by mouth 3 (three) times a day 90 tablet 0    lamoTRIgine (LaMICtal) 200 MG tablet Take 1 tablet (200 mg total) by mouth 2 (two) times a day 60 tablet 0    lisdexamfetamine (Vyvanse) 40 MG capsule Take 1 capsule (40 mg " total) by mouth every morning Max Daily Amount: 40 mg 30 capsule 0    lisdexamfetamine (Vyvanse) 40 MG capsule Take 1 capsule (40 mg total) by mouth every morning Max Daily Amount: 40 mg 30 capsule 0    OLANZapine (ZyPREXA) 5 mg tablet Take 1 tablet (5 mg total) by mouth 2 (two) times a day 60 tablet 2    Vyvanse 40 MG capsule Take 1 capsule (40 mg total) by mouth every morning Max Daily Amount: 40 mg 30 capsule 0     No current facility-administered medications for this visit.        No Known Allergies    Review of Systems   Constitutional:  Negative for activity change and appetite change.   Psychiatric/Behavioral:  Negative for decreased concentration, dysphoric mood, sleep disturbance and suicidal ideas. The patient is not nervous/anxious.        Video Exam    There were no vitals filed for this visit.    Physical Exam  Constitutional:       Appearance: Normal appearance. He is normal weight.   Neurological:      Mental Status: He is alert.   Psychiatric:         Attention and Perception: Attention and perception normal.         Mood and Affect: Mood is not anxious or depressed. Affect is blunt.         Behavior: Behavior normal. Behavior is cooperative.         Thought Content: Thought content normal.         Judgment: Judgment normal.      Comments: Monotone speech - baseline          Visit Time  Face to face  Visit Start Time: 2.00 pm   Visit Stop Time: 2.11 pm   Total Visit Duration:  23 minutes total spent in patient care

## 2025-03-13 NOTE — TELEPHONE ENCOUNTER
Writer left detailed voicemail for client informing him of self-pay process and yearly forms due for his appointment today.

## 2025-03-13 NOTE — TELEPHONE ENCOUNTER
Writer called and let client know that the [facility] consent and [facility] New Patient forms are in the Document Center of his MyChart and did not prompt on the E-Check in.

## 2025-03-18 ENCOUNTER — TELEPHONE (OUTPATIENT)
Dept: PSYCHIATRY | Facility: CLINIC | Age: 35
End: 2025-03-18

## 2025-03-18 NOTE — TELEPHONE ENCOUNTER
Writer called and left voicemail for client to reschedule 5/15/25 appointment due to provider being out of office.

## 2025-05-03 DIAGNOSIS — F25.0 SCHIZOAFFECTIVE DISORDER, BIPOLAR TYPE (HCC): ICD-10-CM

## 2025-05-05 RX ORDER — LAMOTRIGINE 200 MG/1
200 TABLET ORAL 2 TIMES DAILY
Qty: 60 TABLET | Refills: 0 | Status: SHIPPED | OUTPATIENT
Start: 2025-05-05

## 2025-05-08 DIAGNOSIS — F41.1 GENERALIZED ANXIETY DISORDER: ICD-10-CM

## 2025-05-12 RX ORDER — GABAPENTIN 600 MG/1
600 TABLET ORAL 3 TIMES DAILY
Qty: 90 TABLET | Refills: 0 | Status: SHIPPED | OUTPATIENT
Start: 2025-05-12

## 2025-06-05 DIAGNOSIS — F25.0 SCHIZOAFFECTIVE DISORDER, BIPOLAR TYPE (HCC): ICD-10-CM

## 2025-06-05 RX ORDER — LAMOTRIGINE 200 MG/1
200 TABLET ORAL 2 TIMES DAILY
Qty: 60 TABLET | Refills: 0 | Status: SHIPPED | OUTPATIENT
Start: 2025-06-05

## 2025-06-05 NOTE — TELEPHONE ENCOUNTER
Writer called client and left voicemail asking him to call back to schedule an appointment with Dr. Purcell and update his insurance information.

## 2025-06-10 DIAGNOSIS — F41.1 GENERALIZED ANXIETY DISORDER: ICD-10-CM

## 2025-06-10 RX ORDER — GABAPENTIN 600 MG/1
600 TABLET ORAL 3 TIMES DAILY
Qty: 90 TABLET | Refills: 0 | Status: SHIPPED | OUTPATIENT
Start: 2025-06-10

## 2025-06-10 NOTE — TELEPHONE ENCOUNTER
Medication Refill Request     Name of Medication Gabapentin  Dose/Frequency 600 mg/Take 1 tablet by mouth 3 times a day.  Quantity 90  Verified pharmacy   [x]  Verified ordering Provider   [x]  Does patient have enough for the next 3 days? Yes [] No [x]  Does patient have a follow-up appointment scheduled? Yes [x] No []   If so when is appointment: 6/26/2025

## 2025-06-24 ENCOUNTER — TELEPHONE (OUTPATIENT)
Dept: PSYCHIATRY | Facility: CLINIC | Age: 35
End: 2025-06-24

## 2025-06-24 NOTE — TELEPHONE ENCOUNTER
Writer left detailed voicemail regarding paperwork due for client's appointment on 6/26/25 with Dr. Purcell.

## 2025-06-26 ENCOUNTER — TELEPHONE (OUTPATIENT)
Dept: PSYCHIATRY | Facility: CLINIC | Age: 35
End: 2025-06-26

## 2025-06-26 ENCOUNTER — TELEMEDICINE (OUTPATIENT)
Dept: PSYCHIATRY | Facility: CLINIC | Age: 35
End: 2025-06-26
Payer: COMMERCIAL

## 2025-06-26 DIAGNOSIS — F90.0 ATTENTION DEFICIT HYPERACTIVITY DISORDER, INATTENTIVE TYPE: ICD-10-CM

## 2025-06-26 DIAGNOSIS — F25.0 SCHIZOAFFECTIVE DISORDER, BIPOLAR TYPE (HCC): ICD-10-CM

## 2025-06-26 DIAGNOSIS — F41.1 GENERALIZED ANXIETY DISORDER: ICD-10-CM

## 2025-06-26 PROCEDURE — 98006 SYNCH AUDIO-VIDEO EST MOD 30: CPT | Performed by: PSYCHIATRY & NEUROLOGY

## 2025-06-26 RX ORDER — LISDEXAMFETAMINE DIMESYLATE 40 MG/1
40 CAPSULE ORAL EVERY MORNING
Qty: 30 CAPSULE | Refills: 0 | Status: SHIPPED | OUTPATIENT
Start: 2025-06-26

## 2025-06-26 RX ORDER — OLANZAPINE 5 MG/1
5 TABLET, FILM COATED ORAL 2 TIMES DAILY
Qty: 60 TABLET | Refills: 2 | Status: SHIPPED | OUTPATIENT
Start: 2025-06-26

## 2025-06-26 RX ORDER — LAMOTRIGINE 200 MG/1
200 TABLET ORAL 2 TIMES DAILY
Qty: 60 TABLET | Refills: 2 | Status: SHIPPED | OUTPATIENT
Start: 2025-06-26

## 2025-06-26 RX ORDER — GABAPENTIN 600 MG/1
600 TABLET ORAL 3 TIMES DAILY
Qty: 90 TABLET | Refills: 2 | Status: SHIPPED | OUTPATIENT
Start: 2025-06-26

## 2025-06-26 NOTE — PSYCH
MEDICATION MANAGEMENT NOTE    Name: Juan Ferguson      : 1990      MRN: [id number]  Encounter Provider: Jerri Purcell MD  Encounter Date: 2025   Encounter department: [facility] OUTPATIENT    Insurance: Payor: [employer] / Plan: [employer] CHO[employer] / Product Type: Blue PPO /      Reason for Visit:   Chief Complaint   Patient presents with    Medication Management   :  Assessment & Plan  Attention deficit hyperactivity disorder, inattentive type    Orders:    Vyvanse 40 MG capsule; Take 1 capsule (40 mg total) by mouth every morning Max Daily Amount: 40 mg    lisdexamfetamine (Vyvanse) 40 MG capsule; Take 1 capsule (40 mg total) by mouth every morning Max Daily Amount: 40 mg    lisdexamfetamine (Vyvanse) 40 MG capsule; Take 1 capsule (40 mg total) by mouth every morning Max Daily Amount: 40 mg    Schizoaffective disorder, bipolar type (HCC)    Orders:    OLANZapine (ZyPREXA) 5 mg tablet; Take 1 tablet (5 mg total) by mouth in the morning and 1 tablet (5 mg total) before bedtime.    lamoTRIgine (LaMICtal) 200 MG tablet; Take 1 tablet (200 mg total) by mouth 2 (two) times a day    Generalized anxiety disorder    Orders:    gabapentin (NEURONTIN) 600 MG tablet; Take 1 tablet (600 mg total) by mouth 3 (three) times a day        Treatment Recommendations:    Educated about diagnosis and treatment modalities. Verbalizes understanding and agreement with the treatment plan.  Discussed self monitoring of symptoms, and symptom monitoring tools.  Discussed medications and if treatment adjustment was needed or desired.  I am scheduling this patient out for greater than 3 months: No    Medications Risks/Benefits:      Risks, Benefits And Possible Side Effects Of Medications:    Risks, benefits, and possible side effects of medications explained to Juan and he (or legal representative) verbalizes understanding and agreement for treatment.    Controlled Medication Discussion:  "    Juan is using medication appropriately.      History of Present Illness       Pt presents for regular f/u .  Off vyvanse for several months; continued other meds; denies SE  Pt drinks 2 \" big cups of coffee\" daily  C/o feeling tired, some muscle pain, upset stomach sometimes.  Blood work not done yet; no recent Pcp visit.  Pt continues to work; lives with mother  He c/o poor concentration off vyvanse; less organized, less productive  2-3 x week gets \" tense\" and jittery;feeling increased energy and racing thoughts.  When alone, has intrusive thoughts - \" what people think about me\"; \" are they against me?\".  Denies AH or panic attacks  Denies depression, but c/o feeling tired sometimes.  Sleep, appetite - good    Review Of Systems: A review of systems is obtained and is negative except for the pertinent positives listed in HPI/Subjective above.      Current Rating Scores:         Areas of Improvement: reviewed in HPI/Subjective Section      Past Medical History[1]  Past Surgical History[2]  Allergies: Allergies[3]    Current Outpatient Medications   Medication Instructions    gabapentin (NEURONTIN) 600 mg, Oral, 3 times daily    lamoTRIgine (LAMICTAL) 200 mg, Oral, 2 times daily    lisdexamfetamine (VYVANSE) 40 mg, Oral, Every morning    lisdexamfetamine (VYVANSE) 40 mg, Oral, Every morning    OLANZapine (ZYPREXA) 5 mg, Oral, 2 times daily    Vyvanse 40 mg, Oral, Every morning        Substance Abuse History:    Tobacco, Alcohol and Drug Use History     Tobacco Use    Smoking status: Every Day     Current packs/day: 0.50     Types: Cigarettes    Smokeless tobacco: Not on file   Substance Use Topics    Alcohol use: Not on file    Drug use: Not on file          Social History:    Social History     Socioeconomic History    Marital status: Single     Spouse name: Not on file    Number of children: Not on file    Years of education: Not on file    Highest education level: Not on file   Occupational History    Not " on file   Other Topics Concern    Not on file   Social History Narrative    Not on file        Family Psychiatric History:     Family History[4]    Medical History Reviewed by provider this encounter:  Tobacco  Allergies  Meds  Problems  Med Hx  Surg Hx  Fam Hx          Objective   There were no vitals taken for this visit.     Mental Status Evaluation:    Appearance age appropriate, casually dressed   Behavior cooperative   Speech normal rate, monotonous   Mood euthymic   Affect constricted   Thought Processes logical   Thought Content no overt delusions   Perceptual Disturbances: no auditory hallucinations, no visual hallucinations   Abnormal Thoughts  Risk Potential Suicidal ideation - None  Homicidal ideation - None  Potential for aggression - No   Orientation grossly oriented   Memory recent and remote memory grossly intact   Consciousness alert and awake   Attention Span Concentration Span decreased concentration   Intellect appears to be of average intelligence   Insight limited   Judgement limited   Muscle Strength and  Gait unable to assess today due to virtual visit   Motor activity unable to assess today due to virtual visit   Language no difficulty naming common objects   Fund of Knowledge adequate knowledge of current events       Laboratory Results: I have personally reviewed all pertinent laboratory/tests results        Suicide/Homicide Risk Assessment:    Risk of Harm to Self:  Based on today's assessment, Juan presents the following risk of harm to self: none    Risk of Harm to Others:  Based on today's assessment, Juan presents the following risk of harm to others: none    The following interventions are recommended: Continue medication management.    Psychotherapy Provided:     Individual psychotherapy provided: No    Treatment Plan:    Completed and signed during the session: Yes - Treatment Plan done but not signed at time of office visit due to: Plan reviewed by video and verbal  consent given due to virtual visit. Treatment Plan sent to patient via [employer] for signature.    Goals: Progress towards Treatment Plan goals - Yes, limited progress, as evidenced by subjective findings in HPI/Subjective Section    Depression Follow-up Plan Completed: Not applicable    Note Share:        Administrative Statements   Administrative Statements   Encounter provider Jerri Purcell MD    The Patient is located at Home and in the following state in which I hold an active license [place].    The patient was identified by name and date of birth. Juan Ferguson was informed that this is a telemedicine visit and that the visit is being conducted through the Epic Embedded platform. He agrees to proceed..  My office door was closed. No one else was in the room.  He acknowledged consent and understanding of privacy and security of the video platform. The patient has agreed to participate and understands they can discontinue the visit at any time.    I have spent a total time of 26 minutes in caring for this patient on the day of the visit/encounter including Risks and benefits of tx options, Instructions for management, Documenting in the medical record, and Reviewing/placing orders in the medical record (including tests, medications, and/or procedures), not including the time spent for establishing the audio/video connection.    Visit Time  Face to face  Visit Start Time: 2.00 pm   Visit Stop Time: 2.12 pm   Total Visit Duration: 26 minutes total spent in patient care        Jerri Purcell MD 06/26/25       [1] No past medical history on file.  [2] No past surgical history on file.  [3] No Known Allergies  [4]   Family History  Problem Relation Name Age of Onset    Bipolar disorder Mother

## 2025-06-26 NOTE — BH TREATMENT PLAN
TREATMENT PLAN (Medication Management Only)        [facility]    Name and Date of Birth:  Juan Ferguson 34 y.o. 1990  MRN: [id number]  Date of Treatment Plan: June 26, 2025  Diagnosis/Diagnoses:    1. Attention deficit hyperactivity disorder, inattentive type    2. Schizoaffective disorder, bipolar type (HCC)    3. Generalized anxiety disorder      Strengths/Personal Resources for Self-Care: taking medications as prescribed, motivation for treatment.  Area/Areas of need (in own words): mood instability, ADHD symptoms  1. Long Term Goal:   maintain mood stability.  Target Date:6 months - 12/26/2025  Person/Persons responsible for completion of goal: Juan  2.  Short Term Objective (s) - How will we reach this goal?:   A.  Provider new recommended medication/dosage changes and/or continue medication(s): continue current medications as prescribed Lamictal, Neurontin, Zyprexa, and Vyvanse.  B.  N/A.  C.  N/A.  Target Date:6 months - 12/26/2025  Person/Persons Responsible for Completion of Goal: Juan  Progress Towards Goals: continuing treatment  Treatment Modality: medication management every 3 months  Review due 180 days from date of this plan: 6 months - 12/26/2025  Expected length of service: ongoing treatment unless revised  My Physician/PA/NP and I have developed this plan together and I agree to work on the goals and objectives. I understand the treatment goals that were developed for my treatment.   Electronic Signatures: on file (unless signed below)    Jerri Purcell MD 06/26/25